# Patient Record
Sex: MALE | Race: WHITE | NOT HISPANIC OR LATINO | Employment: OTHER | URBAN - NONMETROPOLITAN AREA
[De-identification: names, ages, dates, MRNs, and addresses within clinical notes are randomized per-mention and may not be internally consistent; named-entity substitution may affect disease eponyms.]

---

## 2017-01-12 ENCOUNTER — HOSPITAL ENCOUNTER (OUTPATIENT)
Dept: OTHER | Facility: HOSPITAL | Age: 52
Discharge: HOME | End: 2017-01-12
Attending: PAIN MEDICINE | Admitting: PAIN MEDICINE

## 2017-01-16 ENCOUNTER — TELEPHONE (OUTPATIENT)
Dept: PAIN MEDICINE | Facility: CLINIC | Age: 52
End: 2017-01-16

## 2017-01-16 NOTE — TELEPHONE ENCOUNTER
Pt called this morning to state that the injections that he had on 01/12/2017 were effective (80%) and he would like to get the next set scheduled.

## 2017-01-26 ENCOUNTER — HOSPITAL ENCOUNTER (OUTPATIENT)
Dept: INTERVENTIONAL RADIOLOGY/VASCULAR | Facility: HOSPITAL | Age: 52
Discharge: HOME | End: 2017-01-26
Admitting: PAIN MEDICINE

## 2017-01-26 ENCOUNTER — DOCUMENTATION (OUTPATIENT)
Dept: PAIN MEDICINE | Facility: CLINIC | Age: 52
End: 2017-01-26

## 2017-01-26 ENCOUNTER — HOSPITAL ENCOUNTER (OUTPATIENT)
Dept: INTERVENTIONAL RADIOLOGY/VASCULAR | Facility: HOSPITAL | Age: 52
Discharge: HOME | End: 2017-01-26

## 2017-01-26 VITALS
DIASTOLIC BLOOD PRESSURE: 96 MMHG | SYSTOLIC BLOOD PRESSURE: 158 MMHG | OXYGEN SATURATION: 97 % | RESPIRATION RATE: 20 BRPM | HEART RATE: 109 BPM

## 2017-01-26 DIAGNOSIS — M47.817 LUMBOSACRAL SPONDYLOSIS WITHOUT MYELOPATHY: ICD-10-CM

## 2017-01-26 PROCEDURE — 25010000002 METHYLPREDNISOLONE PER 40 MG: Performed by: PAIN MEDICINE

## 2017-01-26 PROCEDURE — 64494 INJ PARAVERT F JNT L/S 2 LEV: CPT | Performed by: PAIN MEDICINE

## 2017-01-26 PROCEDURE — 64493 INJ PARAVERT F JNT L/S 1 LEV: CPT | Performed by: PAIN MEDICINE

## 2017-01-26 RX ORDER — METHYLPREDNISOLONE ACETATE 40 MG/ML
INJECTION, SUSPENSION INTRA-ARTICULAR; INTRALESIONAL; INTRAMUSCULAR; SOFT TISSUE
Status: COMPLETED | OUTPATIENT
Start: 2017-01-26 | End: 2017-01-26

## 2017-01-26 RX ORDER — LIDOCAINE HYDROCHLORIDE 20 MG/ML
INJECTION, SOLUTION INFILTRATION; PERINEURAL
Status: COMPLETED | OUTPATIENT
Start: 2017-01-26 | End: 2017-01-26

## 2017-01-26 RX ORDER — BUPIVACAINE HYDROCHLORIDE 5 MG/ML
INJECTION, SOLUTION EPIDURAL; INTRACAUDAL
Status: COMPLETED | OUTPATIENT
Start: 2017-01-26 | End: 2017-01-26

## 2017-01-26 RX ADMIN — LIDOCAINE HYDROCHLORIDE 2 ML: 20 INJECTION, SOLUTION INFILTRATION; PERINEURAL at 13:40

## 2017-01-26 RX ADMIN — BUPIVACAINE HYDROCHLORIDE 4 ML: 5 INJECTION, SOLUTION EPIDURAL; INTRACAUDAL; PERINEURAL at 13:41

## 2017-01-26 RX ADMIN — METHYLPREDNISOLONE ACETATE 8 MG: 40 INJECTION, SUSPENSION INTRA-ARTICULAR; INTRALESIONAL; INTRAMUSCULAR; SOFT TISSUE at 13:42

## 2017-01-26 NOTE — PROGRESS NOTES
Subjective   Jason Garcia is a 52 y.o. male    History of Present Illness    Previous clinic note reviewed and unchanged.  Vitals obtained by angio staff and reviewed    Review of Systems    Objective   Physical Exam   ctab  + facet loading b      Assessment/Plan   b lmbb's

## 2017-02-01 ENCOUNTER — TELEPHONE (OUTPATIENT)
Dept: PAIN MEDICINE | Facility: CLINIC | Age: 52
End: 2017-02-01

## 2017-02-23 ENCOUNTER — APPOINTMENT (OUTPATIENT)
Dept: LAB | Facility: HOSPITAL | Age: 52
End: 2017-02-23

## 2017-02-23 ENCOUNTER — OFFICE VISIT (OUTPATIENT)
Dept: PAIN MEDICINE | Facility: CLINIC | Age: 52
End: 2017-02-23

## 2017-02-23 VITALS
BODY MASS INDEX: 28.15 KG/M2 | DIASTOLIC BLOOD PRESSURE: 88 MMHG | SYSTOLIC BLOOD PRESSURE: 140 MMHG | HEIGHT: 70 IN | WEIGHT: 196.6 LBS

## 2017-02-23 DIAGNOSIS — M51.36 DDD (DEGENERATIVE DISC DISEASE), LUMBAR: ICD-10-CM

## 2017-02-23 DIAGNOSIS — M47.817 LUMBOSACRAL SPONDYLOSIS WITHOUT MYELOPATHY: Primary | ICD-10-CM

## 2017-02-23 DIAGNOSIS — Z79.899 HIGH RISK MEDICATIONS (NOT ANTICOAGULANTS) LONG-TERM USE: ICD-10-CM

## 2017-02-23 PROCEDURE — 80307 DRUG TEST PRSMV CHEM ANLYZR: CPT | Performed by: PAIN MEDICINE

## 2017-02-23 PROCEDURE — 99214 OFFICE O/P EST MOD 30 MIN: CPT | Performed by: PAIN MEDICINE

## 2017-02-23 PROCEDURE — G0481 DRUG TEST DEF 8-14 CLASSES: HCPCS | Performed by: PAIN MEDICINE

## 2017-02-23 RX ORDER — RANITIDINE 300 MG/1
TABLET ORAL
COMMUNITY
Start: 2017-02-20

## 2017-02-23 RX ORDER — AMITRIPTYLINE HYDROCHLORIDE 50 MG/1
50 TABLET, FILM COATED ORAL
COMMUNITY
Start: 2016-10-14 | End: 2017-02-23

## 2017-02-23 RX ORDER — HYDROCODONE BITARTRATE AND ACETAMINOPHEN 10; 325 MG/1; MG/1
1 TABLET ORAL 4 TIMES DAILY
Qty: 120 TABLET | Refills: 0 | Status: SHIPPED | OUTPATIENT
Start: 2017-02-23 | End: 2017-03-25

## 2017-02-23 RX ORDER — GABAPENTIN 400 MG/1
400 CAPSULE ORAL
COMMUNITY
Start: 2016-10-14

## 2017-02-23 RX ORDER — RANITIDINE 300 MG/1
300 TABLET ORAL
COMMUNITY
Start: 2017-01-18 | End: 2018-01-19

## 2017-02-23 RX ORDER — HYDROCODONE BITARTRATE AND ACETAMINOPHEN 10; 325 MG/1; MG/1
TABLET ORAL
COMMUNITY
Start: 2015-12-18

## 2017-02-23 NOTE — PROGRESS NOTES
Jason Garcia is a 52 y.o. male.   1965    HPI:   Location: lower back  Quality: sharp, dull and tingling  Severity: 8/10  Timing: constant  Alleviating: rest  Aggravating: increased activity     Patient denies side effects, he reports that his opioid medication still provides significant relief and allows him to be more active.  LMBB's helped the first time, but not really the second.        The following portions of the patient's history were reviewed by me and updated as appropriate: allergies, current medications, past family history, past medical history, past social history, past surgical history and problem list.    Past Medical History   Diagnosis Date   • Anxiety      Anxiety state      • Arthropathy of lumbar facet joint    • Degeneration of lumbar intervertebral disc    • Depression      Depressive disorder      • Drug therapy      Other long term (current) drug therapy      • Hypertensive disorder    • Osteoporosis        Social History     Social History   • Marital status:      Spouse name: N/A   • Number of children: N/A   • Years of education: N/A     Occupational History   • Not on file.     Social History Main Topics   • Smoking status: Former Smoker   • Smokeless tobacco: Not on file   • Alcohol use No   • Drug use: No   • Sexual activity: Defer     Other Topics Concern   • Not on file     Social History Narrative       Family History   Problem Relation Age of Onset   • Alcohol abuse Other    • Hyperlipidemia Other    • Hypertension Other    • Migraines Other    • Mental illness Other      Mental Disorder         Current Outpatient Prescriptions:   •  amitriptyline (ELAVIL) 50 MG tablet, Take 50 mg by mouth every night., Disp: , Rfl:   •  diclofenac sodium (VOTAREN XR) 100 MG 24 hr tablet, Take 100 mg by mouth daily., Disp: , Rfl:   •  gabapentin (NEURONTIN) 400 MG capsule, Take 400 mg by mouth 3 (three) times a day., Disp: , Rfl:   •  HYDROcodone-acetaminophen (NORCO)  MG  per tablet, Take 1 tablet by mouth every 6 (six) hours as needed., Disp: , Rfl:   •  isosorbide mononitrate (IMDUR) 30 MG 24 hr tablet, Take 30 mg by mouth daily., Disp: , Rfl:   •  metoprolol tartrate (LOPRESSOR) 25 MG tablet, TAKE 1 TABLET BY MOUTH NIGHTLY., Disp: , Rfl:   •  raNITIdine (ZANTAC) 300 MG tablet, Take 300 mg by mouth., Disp: , Rfl:   •  gabapentin (NEURONTIN) 400 MG capsule, Take 400 mg by mouth., Disp: , Rfl:   •  HYDROcodone-acetaminophen (NORCO)  MG per tablet, 1 tab po q6 hrs from 12/21/15-1/8/16, until seen by Pain Mgmt, Disp: , Rfl:   •  HYDROcodone-acetaminophen (NORCO)  MG per tablet, Take 1 tablet by mouth 4 (Four) Times a Day for 30 days., Disp: 120 tablet, Rfl: 0  •  HYDROcodone-acetaminophen (NORCO)  MG per tablet, Take 1 tablet by mouth 4 (Four) Times a Day for 30 days., Disp: 120 tablet, Rfl: 0  •  raNITIdine (ZANTAC) 300 MG tablet, , Disp: , Rfl:     Allergies   Allergen Reactions   • Cymbalta [Duloxetine Hcl]          Review of Systems   Musculoskeletal: Positive for back pain (lower).     10 system review of systems was reviewed and negative except for above.    Physical Exam   Constitutional: He appears well-developed and well-nourished. No distress.   Musculoskeletal:        Lumbar back: He exhibits decreased range of motion (facet loading bilaterally. limited ext to10 deg.  45 deg flexion limited to pain).   Neurological: He is alert. Gait (cane) abnormal.   Psychiatric: He has a normal mood and affect. His behavior is normal. Judgment normal.       Jason was seen today for back pain.    Diagnoses and all orders for this visit:    Lumbosacral spondylosis without myelopathy  -     ToxASSURE Select 13 (MW)    DDD (degenerative disc disease), lumbar  -     ToxASSURE Select 13 (MW)    High risk medications (not anticoagulants) long-term use  -     ToxASSURE Select 13 (MW)    Other orders  -     HYDROcodone-acetaminophen (NORCO)  MG per tablet; Take 1 tablet by  mouth 4 (Four) Times a Day for 30 days.  -     HYDROcodone-acetaminophen (NORCO)  MG per tablet; Take 1 tablet by mouth 4 (Four) Times a Day for 30 days.        Medication: Patient reports no negative side effects, Patient reports appropriate usage and storage habits, Patient's opioid provides enough reflief to be more active and perform activities of daily living with less discomfort. and Refill opioid medication as above.    Interventional: none at this time.  Discussed possible stim trial.    Rehab: none at this time    Behavioral: No aberrant behavior noted. [facility] Report #[id number]  was reviewed and is consistent with stated history    Urine drug screen Ordered today to test for drugs of abuse and prescribed medications          This document has been electronically signed by Speedy Watson MD on February 23, 2017 12:05 PM

## 2017-02-28 LAB
CONV REPORT SUMMARY: NORMAL
Lab: NORMAL

## 2017-04-21 ENCOUNTER — OFFICE VISIT (OUTPATIENT)
Dept: PAIN MEDICINE | Facility: CLINIC | Age: 52
End: 2017-04-21

## 2017-04-21 VITALS
SYSTOLIC BLOOD PRESSURE: 140 MMHG | BODY MASS INDEX: 27.6 KG/M2 | HEIGHT: 70 IN | DIASTOLIC BLOOD PRESSURE: 88 MMHG | WEIGHT: 192.8 LBS

## 2017-04-21 DIAGNOSIS — M47.817 LUMBOSACRAL SPONDYLOSIS WITHOUT MYELOPATHY: Primary | ICD-10-CM

## 2017-04-21 DIAGNOSIS — M51.36 DDD (DEGENERATIVE DISC DISEASE), LUMBAR: ICD-10-CM

## 2017-04-21 DIAGNOSIS — Z79.899 HIGH RISK MEDICATIONS (NOT ANTICOAGULANTS) LONG-TERM USE: ICD-10-CM

## 2017-04-21 PROCEDURE — 99214 OFFICE O/P EST MOD 30 MIN: CPT | Performed by: PAIN MEDICINE

## 2017-04-21 RX ORDER — AMITRIPTYLINE HYDROCHLORIDE 50 MG/1
TABLET, FILM COATED ORAL
COMMUNITY
Start: 2017-04-18

## 2017-04-21 RX ORDER — RANITIDINE 300 MG/1
300 TABLET ORAL
COMMUNITY
Start: 2017-01-18 | End: 2018-01-19

## 2017-04-21 RX ORDER — HYDROCODONE BITARTRATE AND ACETAMINOPHEN 10; 325 MG/1; MG/1
TABLET ORAL
COMMUNITY
Start: 2015-12-18

## 2017-04-21 RX ORDER — HYDROCODONE BITARTRATE AND ACETAMINOPHEN 10; 325 MG/1; MG/1
1 TABLET ORAL 4 TIMES DAILY
Qty: 120 TABLET | Refills: 0 | Status: SHIPPED | OUTPATIENT
Start: 2017-04-21 | End: 2017-05-21

## 2017-04-21 RX ORDER — GABAPENTIN 400 MG/1
400 CAPSULE ORAL
COMMUNITY
Start: 2017-04-18

## 2017-04-21 NOTE — PROGRESS NOTES
Jason Garcia is a 52 y.o. male.   1965    HPI:   Location: lower back  Quality: sharp, dull and tingling  Severity: 8/10  Timing: constant  Alleviating: rest  Aggravating: increased activity     Pain is bothered by the weather today he states.  Otherwise he is stable.  Opioid continues to provide relief and allows him to be more active than he would be otherwise.  No significanty side effects.       The following portions of the patient's history were reviewed by me and updated as appropriate: allergies, current medications, past family history, past medical history, past social history, past surgical history and problem list.    Past Medical History:   Diagnosis Date   • Anxiety     Anxiety state      • Arthropathy of lumbar facet joint    • Degeneration of lumbar intervertebral disc    • Depression     Depressive disorder      • Drug therapy     Other long term (current) drug therapy      • Hypertensive disorder    • Osteoporosis        Social History     Social History   • Marital status:      Spouse name: N/A   • Number of children: N/A   • Years of education: N/A     Occupational History   • Not on file.     Social History Main Topics   • Smoking status: Former Smoker   • Smokeless tobacco: Not on file   • Alcohol use No   • Drug use: No   • Sexual activity: Defer     Other Topics Concern   • Not on file     Social History Narrative       Family History   Problem Relation Age of Onset   • Alcohol abuse Other    • Hyperlipidemia Other    • Hypertension Other    • Migraines Other    • Mental illness Other      Mental Disorder         Current Outpatient Prescriptions:   •  amitriptyline (ELAVIL) 50 MG tablet, Take 50 mg by mouth every night., Disp: , Rfl:   •  diclofenac sodium (VOTAREN XR) 100 MG 24 hr tablet, Take 100 mg by mouth daily., Disp: , Rfl:   •  gabapentin (NEURONTIN) 400 MG capsule, Take 400 mg by mouth 3 (three) times a day., Disp: , Rfl:   •  gabapentin (NEURONTIN) 400 MG capsule,  Take 400 mg by mouth., Disp: , Rfl:   •  HYDROcodone-acetaminophen (NORCO)  MG per tablet, Take 1 tablet by mouth every 6 (six) hours as needed., Disp: , Rfl:   •  isosorbide mononitrate (IMDUR) 30 MG 24 hr tablet, Take 30 mg by mouth daily., Disp: , Rfl:   •  metoprolol tartrate (LOPRESSOR) 25 MG tablet, TAKE 1 TABLET BY MOUTH NIGHTLY., Disp: , Rfl:   •  raNITIdine (ZANTAC) 300 MG tablet, Take 300 mg by mouth., Disp: , Rfl:   •  amitriptyline (ELAVIL) 50 MG tablet, TAKE 1 TABLET BY MOUTH NIGHTLY., Disp: , Rfl:   •  gabapentin (NEURONTIN) 400 MG capsule, Take 400 mg by mouth., Disp: , Rfl:   •  HYDROcodone-acetaminophen (NORCO)  MG per tablet, 1 tab po q6 hrs from 12/21/15-1/8/16, until seen by Pain Mgmt, Disp: , Rfl:   •  HYDROcodone-acetaminophen (NORCO)  MG per tablet, 1 tab po q6 hrs from 12/21/15-1/8/16, until seen by Pain Mgmt, Disp: , Rfl:   •  HYDROcodone-acetaminophen (NORCO)  MG per tablet, Take 1 tablet by mouth 4 (Four) Times a Day for 30 days., Disp: 120 tablet, Rfl: 0  •  HYDROcodone-acetaminophen (NORCO)  MG per tablet, Take 1 tablet by mouth 4 (Four) Times a Day for 30 days., Disp: 120 tablet, Rfl: 0  •  metoprolol tartrate (LOPRESSOR) 25 MG tablet, TAKE 1 TABLET BY MOUTH NIGHTLY., Disp: , Rfl:   •  raNITIdine (ZANTAC) 300 MG tablet, , Disp: , Rfl:   •  raNITIdine (ZANTAC) 300 MG tablet, Take 300 mg by mouth., Disp: , Rfl:     Allergies   Allergen Reactions   • Cymbalta [Duloxetine Hcl]      Made heart speed up- stomach cramps- couldn't sleep- flush face   • Isosorbide Palpitations     Heart palpitations, headache, dizzy         Review of Systems   Musculoskeletal: Positive for back pain (lower).     10 system review of systems was reviewed and negative except for above.    Physical Exam   Constitutional: He appears well-developed and well-nourished. No distress.   Musculoskeletal:        Lumbar back: He exhibits decreased range of motion (45deg flexion with approx 10  deg ext with pain in both directions. ).   Neurological: He is alert. Gait (cane) abnormal.   Psychiatric: He has a normal mood and affect. His behavior is normal. Judgment normal.       Jason was seen today for back pain.    Diagnoses and all orders for this visit:    Lumbosacral spondylosis without myelopathy    DDD (degenerative disc disease), lumbar    High risk medications (not anticoagulants) long-term use    Other orders  -     HYDROcodone-acetaminophen (NORCO)  MG per tablet; Take 1 tablet by mouth 4 (Four) Times a Day for 30 days.  -     HYDROcodone-acetaminophen (NORCO)  MG per tablet; Take 1 tablet by mouth 4 (Four) Times a Day for 30 days.      Medication: Patient reports no negative side effects, Patient reports appropriate usage and storage habits, Patient's opioid provides enough reflief to be more active and perform activities of daily living with less discomfort. and Refill opioid medication as above    Interventional: none at this time.  Not much improvement with interventions in the past.     Rehab: none at this time    Behavioral: No aberrant behavior noted. [facility] Report #[id number]  was reviewed and is consistent with stated history    Urine drug screen Reviewed from last visit and is appropriate          This document has been electronically signed by Speedy Watson MD on April 21, 2017 12:21 PM

## 2017-06-20 ENCOUNTER — OFFICE VISIT (OUTPATIENT)
Dept: PAIN MEDICINE | Facility: CLINIC | Age: 52
End: 2017-06-20

## 2017-06-20 VITALS
SYSTOLIC BLOOD PRESSURE: 140 MMHG | HEIGHT: 70 IN | WEIGHT: 187.1 LBS | DIASTOLIC BLOOD PRESSURE: 80 MMHG | BODY MASS INDEX: 26.79 KG/M2

## 2017-06-20 DIAGNOSIS — M51.36 DDD (DEGENERATIVE DISC DISEASE), LUMBAR: Primary | ICD-10-CM

## 2017-06-20 DIAGNOSIS — Z79.899 HIGH RISK MEDICATIONS (NOT ANTICOAGULANTS) LONG-TERM USE: ICD-10-CM

## 2017-06-20 DIAGNOSIS — Z98.890 HISTORY OF BACK SURGERY: ICD-10-CM

## 2017-06-20 DIAGNOSIS — M47.817 LUMBOSACRAL SPONDYLOSIS WITHOUT MYELOPATHY: ICD-10-CM

## 2017-06-20 PROCEDURE — 99213 OFFICE O/P EST LOW 20 MIN: CPT | Performed by: PAIN MEDICINE

## 2017-06-20 NOTE — PROGRESS NOTES
Jason Garcia is a 52 y.o. male.   1965    HPI:   Location: lower back  Quality: sharp, dull and tingling  Severity: 8/10  Timing: constant  Alleviating: rest  Aggravating: increased activity    Pt remains with Chronic lower back and surgery in 2013. No change in status. Opiate medications provides enough relief for daily activity and ambulation. NO SE. Pt happy with pain management visit and regimen.         The following portions of the patient's history were reviewed by me and updated as appropriate: allergies, current medications, past family history, past medical history, past social history, past surgical history and problem list.    Past Medical History:   Diagnosis Date   • Anxiety     Anxiety state      • Arthropathy of lumbar facet joint    • Degeneration of lumbar intervertebral disc    • Depression     Depressive disorder      • Drug therapy     Other long term (current) drug therapy      • Hypertensive disorder    • Osteoporosis        Social History     Social History   • Marital status:      Spouse name: N/A   • Number of children: N/A   • Years of education: N/A     Occupational History   • Not on file.     Social History Main Topics   • Smoking status: Former Smoker   • Smokeless tobacco: Never Used   • Alcohol use No   • Drug use: No   • Sexual activity: Defer     Other Topics Concern   • Not on file     Social History Narrative       Family History   Problem Relation Age of Onset   • Alcohol abuse Other    • Hyperlipidemia Other    • Hypertension Other    • Migraines Other    • Mental illness Other      Mental Disorder         Current Outpatient Prescriptions:   •  amitriptyline (ELAVIL) 50 MG tablet, Take 50 mg by mouth every night., Disp: , Rfl:   •  amitriptyline (ELAVIL) 50 MG tablet, TAKE 1 TABLET BY MOUTH NIGHTLY., Disp: , Rfl:   •  diclofenac sodium (VOTAREN XR) 100 MG 24 hr tablet, Take 100 mg by mouth daily., Disp: , Rfl:   •  gabapentin (NEURONTIN) 400 MG capsule, Take  400 mg by mouth 3 (three) times a day., Disp: , Rfl:   •  gabapentin (NEURONTIN) 400 MG capsule, Take 400 mg by mouth., Disp: , Rfl:   •  gabapentin (NEURONTIN) 400 MG capsule, Take 400 mg by mouth., Disp: , Rfl:   •  HYDROcodone-acetaminophen (NORCO)  MG per tablet, Take 1 tablet by mouth every 6 (six) hours as needed., Disp: , Rfl:   •  HYDROcodone-acetaminophen (NORCO)  MG per tablet, 1 tab po q6 hrs from 12/21/15-1/8/16, until seen by Pain Mgmt, Disp: , Rfl:   •  HYDROcodone-acetaminophen (NORCO)  MG per tablet, 1 tab po q6 hrs from 12/21/15-1/8/16, until seen by Pain Mgmt, Disp: , Rfl:   •  isosorbide mononitrate (IMDUR) 30 MG 24 hr tablet, Take 30 mg by mouth daily., Disp: , Rfl:   •  metoprolol tartrate (LOPRESSOR) 25 MG tablet, TAKE 1 TABLET BY MOUTH NIGHTLY., Disp: , Rfl:   •  metoprolol tartrate (LOPRESSOR) 25 MG tablet, TAKE 1 TABLET BY MOUTH NIGHTLY., Disp: , Rfl:   •  raNITIdine (ZANTAC) 300 MG tablet, Take 300 mg by mouth., Disp: , Rfl:   •  raNITIdine (ZANTAC) 300 MG tablet, , Disp: , Rfl:   •  raNITIdine (ZANTAC) 300 MG tablet, Take 300 mg by mouth., Disp: , Rfl:     Allergies   Allergen Reactions   • Cymbalta [Duloxetine Hcl]      Made heart speed up- stomach cramps- couldn't sleep- flush face   • Isosorbide Palpitations     Heart palpitations, headache, dizzy       Review of Systems   Musculoskeletal: Positive for back pain.   All other systems reviewed and are negative.    All systems reviewed and negative except for above.    Physical Exam   Constitutional: He is oriented to person, place, and time. He appears well-developed and well-nourished. No distress.   Cardiovascular: Normal rate.    Pulmonary/Chest: Effort normal. No respiratory distress.   Musculoskeletal:        Lumbar back: He exhibits decreased range of motion (Flex 50 deg and  10 deg ext with robin [place]  ) and tenderness ( midline ).   Neurological: He is alert and oriented to person, place, and time. He displays no  seizure activity. Gait (cane) abnormal. Coordination normal.   Skin: Skin is warm and dry. No pallor.   Psychiatric: He has a normal mood and affect. His behavior is normal. Judgment normal. He expresses no homicidal and no suicidal ideation. He expresses no suicidal plans and no homicidal plans.   Nursing note and vitals reviewed.      Jason was seen today for back pain.    Diagnoses and all orders for this visit:    DDD (degenerative disc disease), lumbar    Lumbosacral spondylosis without myelopathy    History of back surgery    High risk medications (not anticoagulants) long-term use        Medication: Patient reports no negative side effects, Patient reports appropriate usage and storage habits and Patient's opioid provides enough reflief to be more active and perform activities of daily living with less discomfort. norco 10mg qid. Discussed case with Yared Watson, opiate medication refilled ×2 hand written scripts.      Interventional: Injections in past with minimal effective.     Rehab: Cane for ambulation.     Behavioral: No aberrant behavior noted. [facility] Report # [id number]  was reviewed and is consistent with stated history    Urine drug screen None at this time          This document has been electronically signed by JOSE Riggs on June 20, 2017 1:57 PM          This document has been electronically signed by JOSE Riggs on June 20, 2017 1:57 PM

## 2017-08-16 ENCOUNTER — APPOINTMENT (OUTPATIENT)
Dept: LAB | Facility: HOSPITAL | Age: 52
End: 2017-08-16

## 2017-08-16 ENCOUNTER — OFFICE VISIT (OUTPATIENT)
Dept: PAIN MEDICINE | Facility: CLINIC | Age: 52
End: 2017-08-16

## 2017-08-16 VITALS
DIASTOLIC BLOOD PRESSURE: 98 MMHG | WEIGHT: 188.6 LBS | SYSTOLIC BLOOD PRESSURE: 150 MMHG | HEIGHT: 70 IN | BODY MASS INDEX: 27 KG/M2

## 2017-08-16 DIAGNOSIS — M51.36 DDD (DEGENERATIVE DISC DISEASE), LUMBAR: Primary | ICD-10-CM

## 2017-08-16 DIAGNOSIS — M47.817 LUMBOSACRAL SPONDYLOSIS WITHOUT MYELOPATHY: ICD-10-CM

## 2017-08-16 DIAGNOSIS — Z79.899 HIGH RISK MEDICATIONS (NOT ANTICOAGULANTS) LONG-TERM USE: ICD-10-CM

## 2017-08-16 PROCEDURE — 99214 OFFICE O/P EST MOD 30 MIN: CPT | Performed by: PAIN MEDICINE

## 2017-08-16 PROCEDURE — 80307 DRUG TEST PRSMV CHEM ANLYZR: CPT | Performed by: PAIN MEDICINE

## 2017-08-16 PROCEDURE — G0481 DRUG TEST DEF 8-14 CLASSES: HCPCS | Performed by: PAIN MEDICINE

## 2017-08-16 RX ORDER — HYDROCODONE BITARTRATE AND ACETAMINOPHEN 10; 325 MG/1; MG/1
1 TABLET ORAL 4 TIMES DAILY
Qty: 120 TABLET | Refills: 0 | Status: SHIPPED | OUTPATIENT
Start: 2017-08-16 | End: 2017-09-15

## 2017-08-16 RX ORDER — GABAPENTIN 400 MG/1
400 CAPSULE ORAL 3 TIMES DAILY
Qty: 90 CAPSULE | Refills: 3 | Status: SHIPPED | OUTPATIENT
Start: 2017-08-16

## 2017-08-16 NOTE — PROGRESS NOTES
Jason Garcia is a 52 y.o. male.   1965    HPI:   Location: lower back  Quality: sharp, dull and tingling  Severity: 8/10  Timing: constant  Alleviating: rest  Aggravating: increased activity     Patient reports that the opioid medication still provides him good relief and allows increased activity than he would have without the opioid medication. He denies side effects.        The following portions of the patient's history were reviewed by me and updated as appropriate: allergies, current medications, past family history, past medical history, past social history, past surgical history and problem list.    Past Medical History:   Diagnosis Date   • Anxiety     Anxiety state      • Arthropathy of lumbar facet joint    • Degeneration of lumbar intervertebral disc    • Depression     Depressive disorder      • Drug therapy     Other long term (current) drug therapy      • Hypertensive disorder    • Osteoporosis        Social History     Social History   • Marital status:      Spouse name: N/A   • Number of children: N/A   • Years of education: N/A     Occupational History   • Not on file.     Social History Main Topics   • Smoking status: Former Smoker   • Smokeless tobacco: Never Used   • Alcohol use No   • Drug use: No   • Sexual activity: Defer     Other Topics Concern   • Not on file     Social History Narrative       Family History   Problem Relation Age of Onset   • Alcohol abuse Other    • Hyperlipidemia Other    • Hypertension Other    • Migraines Other    • Mental illness Other      Mental Disorder         Current Outpatient Prescriptions:   •  amitriptyline (ELAVIL) 50 MG tablet, Take 50 mg by mouth every night., Disp: , Rfl:   •  amitriptyline (ELAVIL) 50 MG tablet, TAKE 1 TABLET BY MOUTH NIGHTLY., Disp: , Rfl:   •  diclofenac sodium (VOTAREN XR) 100 MG 24 hr tablet, Take 50 mg by mouth Daily., Disp: , Rfl:   •  gabapentin (NEURONTIN) 400 MG capsule, Take 400 mg by mouth 3 (three) times a  day., Disp: , Rfl:   •  gabapentin (NEURONTIN) 400 MG capsule, Take 400 mg by mouth., Disp: , Rfl:   •  gabapentin (NEURONTIN) 400 MG capsule, Take 400 mg by mouth., Disp: , Rfl:   •  HYDROcodone-acetaminophen (NORCO)  MG per tablet, Take 1 tablet by mouth every 6 (six) hours as needed., Disp: , Rfl:   •  HYDROcodone-acetaminophen (NORCO)  MG per tablet, 1 tab po q6 hrs from 12/21/15-1/8/16, until seen by Pain Mgmt, Disp: , Rfl:   •  HYDROcodone-acetaminophen (NORCO)  MG per tablet, 1 tab po q6 hrs from 12/21/15-1/8/16, until seen by Pain Mgmt, Disp: , Rfl:   •  isosorbide mononitrate (IMDUR) 30 MG 24 hr tablet, Take 30 mg by mouth daily., Disp: , Rfl:   •  metoprolol tartrate (LOPRESSOR) 25 MG tablet, TAKE 1 TABLET BY MOUTH NIGHTLY., Disp: , Rfl:   •  metoprolol tartrate (LOPRESSOR) 25 MG tablet, TAKE 1 TABLET BY MOUTH NIGHTLY., Disp: , Rfl:   •  raNITIdine (ZANTAC) 300 MG tablet, Take 300 mg by mouth., Disp: , Rfl:   •  raNITIdine (ZANTAC) 300 MG tablet, , Disp: , Rfl:   •  raNITIdine (ZANTAC) 300 MG tablet, Take 300 mg by mouth., Disp: , Rfl:   •  gabapentin (NEURONTIN) 400 MG capsule, Take 1 capsule by mouth 3 (Three) Times a Day., Disp: 90 capsule, Rfl: 3  •  HYDROcodone-acetaminophen (NORCO)  MG per tablet, Take 1 tablet by mouth 4 (Four) Times a Day for 30 days., Disp: 120 tablet, Rfl: 0  •  HYDROcodone-acetaminophen (NORCO)  MG per tablet, Take 1 tablet by mouth 4 (Four) Times a Day for 30 days., Disp: 120 tablet, Rfl: 0    Allergies   Allergen Reactions   • Cymbalta [Duloxetine Hcl]      Made heart speed up- stomach cramps- couldn't sleep- flush face   • Isosorbide Palpitations     Heart palpitations, headache, dizzy       Review of Systems   Musculoskeletal: Positive for back pain (lower).   All other systems reviewed and are negative.    All systems reviewed and negative except for above.    Physical Exam   Constitutional: He appears well-developed and well-nourished. No  distress.   Musculoskeletal:        Lumbar back: He exhibits decreased range of motion (80deg flexion with approx 15 deg ext with pain in both directions. ).   Neurological: He is alert. Gait (cane) abnormal.   Psychiatric: He has a normal mood and affect. His behavior is normal. Judgment normal.       Jason was seen today for back pain.    Diagnoses and all orders for this visit:    DDD (degenerative disc disease), lumbar  -     ToxASSURE Select 13 (MW)    Lumbosacral spondylosis without myelopathy  -     ToxASSURE Select 13 (MW)    High risk medications (not anticoagulants) long-term use  -     ToxASSURE Select 13 (MW)    Other orders  -     HYDROcodone-acetaminophen (NORCO)  MG per tablet; Take 1 tablet by mouth 4 (Four) Times a Day for 30 days.  -     HYDROcodone-acetaminophen (NORCO)  MG per tablet; Take 1 tablet by mouth 4 (Four) Times a Day for 30 days.  -     gabapentin (NEURONTIN) 400 MG capsule; Take 1 capsule by mouth 3 (Three) Times a Day.      Medication: Patient reports no negative side effects, Patient reports appropriate usage and storage habits, Patient's opioid provides enough reflief to be more active and perform activities of daily living with less discomfort. and Refill opioid medication as above    Interventional: none at this time    Rehab: none at this time    Behavioral: No aberrant behavior noted. [facility] Report #[id number]  was reviewed and is consistent with stated history    Urine drug screen Ordered today to test for drugs of abuse and prescribed medications          This document has been electronically signed by Speedy Watson MD on August 16, 2017 2:31 PM

## 2017-08-24 LAB — CONV REPORT SUMMARY: NORMAL

## 2017-10-18 ENCOUNTER — OFFICE VISIT (OUTPATIENT)
Dept: PAIN MEDICINE | Facility: CLINIC | Age: 52
End: 2017-10-18

## 2017-10-18 VITALS
HEIGHT: 70 IN | WEIGHT: 187 LBS | SYSTOLIC BLOOD PRESSURE: 140 MMHG | DIASTOLIC BLOOD PRESSURE: 88 MMHG | BODY MASS INDEX: 26.77 KG/M2

## 2017-10-18 DIAGNOSIS — M51.36 DDD (DEGENERATIVE DISC DISEASE), LUMBAR: Primary | ICD-10-CM

## 2017-10-18 DIAGNOSIS — M54.5 CHRONIC LOW BACK PAIN, UNSPECIFIED BACK PAIN LATERALITY, WITH SCIATICA PRESENCE UNSPECIFIED: ICD-10-CM

## 2017-10-18 DIAGNOSIS — Z79.899 HIGH RISK MEDICATIONS (NOT ANTICOAGULANTS) LONG-TERM USE: ICD-10-CM

## 2017-10-18 DIAGNOSIS — Z98.890 HISTORY OF BACK SURGERY: ICD-10-CM

## 2017-10-18 DIAGNOSIS — M79.18 MYOFACIAL MUSCLE PAIN: ICD-10-CM

## 2017-10-18 DIAGNOSIS — G89.29 CHRONIC LOW BACK PAIN, UNSPECIFIED BACK PAIN LATERALITY, WITH SCIATICA PRESENCE UNSPECIFIED: ICD-10-CM

## 2017-10-18 PROCEDURE — 99214 OFFICE O/P EST MOD 30 MIN: CPT | Performed by: PAIN MEDICINE

## 2017-10-18 NOTE — PROGRESS NOTES
"Jason Garcia is a 52 y.o. male.   1965    HPI:   Location: lower back  Quality: sharp, dull and tingling  Severity: 8/10  Timing: constant  Alleviating: rest  Aggravating: increased activity    Pt reports \"no change in chronic low back - hx surgery\"- controlled by opiates. Pt uses cane for ambulation. Opiate medications provides enough relief for daily activity and ambulation. NO SE. Pt happy with pain management visit and regimen.             The following portions of the patient's history were reviewed by me and updated as appropriate: allergies, current medications, past family history, past medical history, past social history, past surgical history and problem list.    Past Medical History:   Diagnosis Date   • Anxiety     Anxiety state      • Arthropathy of lumbar facet joint    • Degeneration of lumbar intervertebral disc    • Depression     Depressive disorder      • Drug therapy     Other long term (current) drug therapy      • Hypertensive disorder    • Osteoporosis        Social History     Social History   • Marital status:      Spouse name: N/A   • Number of children: N/A   • Years of education: N/A     Occupational History   • Not on file.     Social History Main Topics   • Smoking status: Former Smoker   • Smokeless tobacco: Never Used   • Alcohol use No   • Drug use: No   • Sexual activity: Defer     Other Topics Concern   • Not on file     Social History Narrative       Family History   Problem Relation Age of Onset   • Alcohol abuse Other    • Hyperlipidemia Other    • Hypertension Other    • Migraines Other    • Mental illness Other      Mental Disorder         Current Outpatient Prescriptions:   •  amitriptyline (ELAVIL) 50 MG tablet, TAKE 1 TABLET BY MOUTH NIGHTLY., Disp: , Rfl:   •  diclofenac sodium (VOTAREN XR) 100 MG 24 hr tablet, Take 50 mg by mouth Daily., Disp: , Rfl:   •  gabapentin (NEURONTIN) 400 MG capsule, Take 400 mg by mouth 3 (three) times a day., Disp: , Rfl:   • "  HYDROcodone-acetaminophen (NORCO)  MG per tablet, Take 1 tablet by mouth every 6 (six) hours as needed., Disp: , Rfl:   •  isosorbide mononitrate (IMDUR) 30 MG 24 hr tablet, Take 30 mg by mouth daily., Disp: , Rfl:   •  metoprolol tartrate (LOPRESSOR) 25 MG tablet, TAKE 1 TABLET BY MOUTH NIGHTLY., Disp: , Rfl:   •  raNITIdine (ZANTAC) 300 MG tablet, Take 300 mg by mouth., Disp: , Rfl:   •  amitriptyline (ELAVIL) 50 MG tablet, Take 50 mg by mouth every night., Disp: , Rfl:   •  gabapentin (NEURONTIN) 400 MG capsule, Take 400 mg by mouth., Disp: , Rfl:   •  gabapentin (NEURONTIN) 400 MG capsule, Take 400 mg by mouth., Disp: , Rfl:   •  gabapentin (NEURONTIN) 400 MG capsule, Take 1 capsule by mouth 3 (Three) Times a Day., Disp: 90 capsule, Rfl: 3  •  HYDROcodone-acetaminophen (NORCO)  MG per tablet, 1 tab po q6 hrs from 12/21/15-1/8/16, until seen by Pain Mgmt, Disp: , Rfl:   •  HYDROcodone-acetaminophen (NORCO)  MG per tablet, 1 tab po q6 hrs from 12/21/15-1/8/16, until seen by Pain Mgmt, Disp: , Rfl:   •  metoprolol tartrate (LOPRESSOR) 25 MG tablet, TAKE 1 TABLET BY MOUTH NIGHTLY., Disp: , Rfl:   •  raNITIdine (ZANTAC) 300 MG tablet, Take 300 mg by mouth., Disp: , Rfl:   •  raNITIdine (ZANTAC) 300 MG tablet, , Disp: , Rfl:     Allergies   Allergen Reactions   • Cymbalta [Duloxetine Hcl]      Made heart speed up- stomach cramps- couldn't sleep- flush face   • Isosorbide Palpitations     Heart palpitations, headache, dizzy       Review of Systems   Musculoskeletal: Positive for back pain.   All other systems reviewed and are negative.    All systems reviewed and negative except for above.    Physical Exam   Constitutional: He is oriented to person, place, and time. He appears well-developed and well-nourished. No distress.   Cardiovascular: Normal rate.    Pulmonary/Chest: Effort normal. No respiratory distress.   Musculoskeletal:        Lumbar back: He exhibits decreased range of motion (Flex <60  deg and  <10 deg ext with bilateral facet loading) and tenderness ( midline  ttp).   Neurological: He is alert and oriented to person, place, and time. He displays no tremor. He displays no seizure activity. Gait (ambulation with cane) abnormal. Coordination normal.   Skin: Skin is warm and dry. No pallor.   Psychiatric: He has a normal mood and affect. His behavior is normal. Judgment normal. He expresses no homicidal and no suicidal ideation. He expresses no suicidal plans and no homicidal plans.   Nursing note and vitals reviewed.      Jason was seen today for back pain.    Diagnoses and all orders for this visit:    DDD (degenerative disc disease), lumbar    Chronic low back pain, unspecified back pain laterality, with sciatica presence unspecified    History of back surgery    Myofacial muscle pain    High risk medications (not anticoagulants) long-term use        Medication: Patient reports no negative side effects, Patient reports appropriate usage and storage habits and Patient's opioid provides enough relief to be more active and perform activities of daily living with less discomfort. norco 10mg qid. Discussed case with Yared Watson, opiate medication refilled ×2 hand written scripts.      Interventional: MARIZOL and any neurological impairments discussed with patient that may need of emergency evaluation.      Rehab: uses cane for ambulation.,     Behavioral: No aberrant behavior noted. YANI Report # [id number]  was reviewed and is consistent with stated history    Urine drug screen Reviewed from last visit and is appropriate        This document has been electronically signed by JOSE Riggs on October 18, 2017 1:50 PM          This document has been electronically signed by JOSE Riggs on October 18, 2017 1:50 PM

## 2017-12-13 ENCOUNTER — OFFICE VISIT (OUTPATIENT)
Dept: PAIN MEDICINE | Facility: CLINIC | Age: 52
End: 2017-12-13

## 2017-12-13 VITALS
DIASTOLIC BLOOD PRESSURE: 80 MMHG | SYSTOLIC BLOOD PRESSURE: 140 MMHG | HEIGHT: 70 IN | BODY MASS INDEX: 27.4 KG/M2 | WEIGHT: 191.4 LBS

## 2017-12-13 DIAGNOSIS — G89.29 CHRONIC BILATERAL LOW BACK PAIN WITHOUT SCIATICA: ICD-10-CM

## 2017-12-13 DIAGNOSIS — M54.50 CHRONIC BILATERAL LOW BACK PAIN WITHOUT SCIATICA: ICD-10-CM

## 2017-12-13 DIAGNOSIS — Z98.890 HX OF LUMBOSACRAL SPINE SURGERY: ICD-10-CM

## 2017-12-13 DIAGNOSIS — M51.36 DDD (DEGENERATIVE DISC DISEASE), LUMBAR: Primary | ICD-10-CM

## 2017-12-13 PROCEDURE — 99213 OFFICE O/P EST LOW 20 MIN: CPT | Performed by: PAIN MEDICINE

## 2017-12-13 RX ORDER — HYDROCODONE BITARTRATE AND ACETAMINOPHEN 10; 325 MG/1; MG/1
1 TABLET ORAL 4 TIMES DAILY
Qty: 120 TABLET | Refills: 0 | Status: SHIPPED | OUTPATIENT
Start: 2017-12-13 | End: 2018-01-12

## 2017-12-13 RX ORDER — GABAPENTIN 400 MG/1
400 CAPSULE ORAL 3 TIMES DAILY
Qty: 90 CAPSULE | Refills: 3 | Status: SHIPPED | OUTPATIENT
Start: 2017-12-13

## 2017-12-13 NOTE — PROGRESS NOTES
Jason Garcia is a 52 y.o. male.   1965    HPI:   Location: lower back  Quality: sharp, dull and tingling  Severity: 8/10  Timing: constant  Alleviating: rest  Aggravating: increased activity     Patient reports that the opioid medication still provides him good relief and allows more activity than he would have without the opioid medication. He denies side effects.        The following portions of the patient's history were reviewed by me and updated as appropriate: allergies, current medications, past family history, past medical history, past social history, past surgical history and problem list.    Past Medical History:   Diagnosis Date   • Anxiety     Anxiety state      • Arthropathy of lumbar facet joint    • Degeneration of lumbar intervertebral disc    • Depression     Depressive disorder      • Drug therapy     Other long term (current) drug therapy      • Hypertensive disorder    • Osteoporosis        Social History     Social History   • Marital status:      Spouse name: N/A   • Number of children: N/A   • Years of education: N/A     Occupational History   • Not on file.     Social History Main Topics   • Smoking status: Former Smoker   • Smokeless tobacco: Never Used   • Alcohol use No   • Drug use: No   • Sexual activity: Defer     Other Topics Concern   • Not on file     Social History Narrative       Family History   Problem Relation Age of Onset   • Alcohol abuse Other    • Hyperlipidemia Other    • Hypertension Other    • Migraines Other    • Mental illness Other      Mental Disorder         Current Outpatient Prescriptions:   •  amitriptyline (ELAVIL) 50 MG tablet, Take 50 mg by mouth every night., Disp: , Rfl:   •  amitriptyline (ELAVIL) 50 MG tablet, TAKE 1 TABLET BY MOUTH NIGHTLY., Disp: , Rfl:   •  diclofenac sodium (VOTAREN XR) 100 MG 24 hr tablet, Take 50 mg by mouth Daily., Disp: , Rfl:   •  gabapentin (NEURONTIN) 400 MG capsule, Take 400 mg by mouth., Disp: , Rfl:   •   gabapentin (NEURONTIN) 400 MG capsule, Take 400 mg by mouth., Disp: , Rfl:   •  gabapentin (NEURONTIN) 400 MG capsule, Take 1 capsule by mouth 3 (Three) Times a Day., Disp: 90 capsule, Rfl: 3  •  gabapentin (NEURONTIN) 400 MG capsule, Take 1 capsule by mouth 3 (Three) Times a Day., Disp: 90 capsule, Rfl: 3  •  HYDROcodone-acetaminophen (NORCO)  MG per tablet, Take 1 tablet by mouth every 6 (six) hours as needed., Disp: , Rfl:   •  HYDROcodone-acetaminophen (NORCO)  MG per tablet, 1 tab po q6 hrs from 12/21/15-1/8/16, until seen by Pain Mgmt, Disp: , Rfl:   •  HYDROcodone-acetaminophen (NORCO)  MG per tablet, 1 tab po q6 hrs from 12/21/15-1/8/16, until seen by Pain Mgmt, Disp: , Rfl:   •  isosorbide mononitrate (IMDUR) 30 MG 24 hr tablet, Take 30 mg by mouth daily., Disp: , Rfl:   •  metoprolol tartrate (LOPRESSOR) 25 MG tablet, TAKE 1 TABLET BY MOUTH NIGHTLY., Disp: , Rfl:   •  metoprolol tartrate (LOPRESSOR) 25 MG tablet, TAKE 1 TABLET BY MOUTH NIGHTLY., Disp: , Rfl:   •  raNITIdine (ZANTAC) 300 MG tablet, Take 300 mg by mouth., Disp: , Rfl:   •  raNITIdine (ZANTAC) 300 MG tablet, , Disp: , Rfl:   •  raNITIdine (ZANTAC) 300 MG tablet, Take 300 mg by mouth., Disp: , Rfl:   •  HYDROcodone-acetaminophen (NORCO)  MG per tablet, Take 1 tablet by mouth 4 (Four) Times a Day for 30 days., Disp: 120 tablet, Rfl: 0  •  HYDROcodone-acetaminophen (NORCO)  MG per tablet, Take 1 tablet by mouth 4 (Four) Times a Day for 30 days., Disp: 120 tablet, Rfl: 0    Allergies   Allergen Reactions   • Cymbalta [Duloxetine Hcl]      Made heart speed up- stomach cramps- couldn't sleep- flush face   • Isosorbide Palpitations     Heart palpitations, headache, dizzy       Review of Systems   Musculoskeletal: Positive for back pain (lower).   All other systems reviewed and are negative.    All systems reviewed and negative except for above.    Physical Exam   Constitutional: He appears well-developed and  well-nourished. No distress.   Musculoskeletal:        Lumbar back: He exhibits decreased range of motion (45deg flexion with approx 10-15 deg ext with pain in both directions. ).   Neurological: He is alert. Gait (cane) abnormal.   Psychiatric: He has a normal mood and affect. His behavior is normal. Judgment normal.       Jason was seen today for back pain.    Diagnoses and all orders for this visit:    DDD (degenerative disc disease), lumbar    Chronic bilateral low back pain without sciatica    Hx of lumbosacral spine surgery    Other orders  -     HYDROcodone-acetaminophen (NORCO)  MG per tablet; Take 1 tablet by mouth 4 (Four) Times a Day for 30 days.  -     HYDROcodone-acetaminophen (NORCO)  MG per tablet; Take 1 tablet by mouth 4 (Four) Times a Day for 30 days.  -     gabapentin (NEURONTIN) 400 MG capsule; Take 1 capsule by mouth 3 (Three) Times a Day.        Medication: Patient reports no negative side effects, Patient reports appropriate usage and storage habits, Patient's opioid provides enough relief to be more active and perform activities of daily living with less discomfort. and Refill opioid medication as above.   I explained that the pain clinic will be closing after the first of the year.  I let the pt know where I will be located.     Interventional: none at this time    Rehab: none at this time    Behavioral: No aberrant behavior noted. YANI Report #[id number]  was reviewed and is consistent with stated history    Urine drug screen None at this time          This document has been electronically signed by Speedy Waston MD on December 13, 2017 2:16 PM

## 2017-12-21 ENCOUNTER — OFFICE VISIT (OUTPATIENT)
Dept: SLEEP MEDICINE | Facility: HOSPITAL | Age: 52
End: 2017-12-21

## 2017-12-21 VITALS
OXYGEN SATURATION: 99 % | WEIGHT: 193 LBS | BODY MASS INDEX: 27.63 KG/M2 | HEART RATE: 128 BPM | DIASTOLIC BLOOD PRESSURE: 74 MMHG | HEIGHT: 70 IN | SYSTOLIC BLOOD PRESSURE: 136 MMHG

## 2017-12-21 DIAGNOSIS — G47.33 OBSTRUCTIVE SLEEP APNEA, ADULT: Primary | ICD-10-CM

## 2017-12-21 PROCEDURE — 99203 OFFICE O/P NEW LOW 30 MIN: CPT | Performed by: INTERNAL MEDICINE

## 2017-12-21 NOTE — PROGRESS NOTES
New Patient Sleep Medicine Consultation    Encounter Date: 12/21/2017         Patient's PCP: Obi Redd MD  Referring provider: No ref. provider found  Reason for consultation: snoring, witnessed apneas, excessive daytime sleepiness and unrefreshing sleep    Jason Garcia is a 52 y.o. male who presents with above complaints for many years. He  admits to daytime fatigue, teeth grinding, night sweats, morning headaches, HTN, RLS, vivid dreams, and non-restorative sleep. His bedtime is ~ 4632-2382. He  falls asleep after  minutes, and is up infrequently, but tosses all night. He wakes up ~ 2493-3751. He drinks 0 cups of coffee, 1 teas, and 3-5 sodas per day. He drinks no alcoholic beverages per week. He does not smoke (quit after 6 years, [id number]). He denies cataplexy, sleep paralysis, or hypnagogic hallucinations. He takes elavil to help with sleep onset. He has rare sleepiness with driving. He occasionally naps.     [place] - 17    Past Medical History:   Diagnosis Date   • Anxiety     Anxiety state      • Arthropathy of lumbar facet joint    • Degeneration of lumbar intervertebral disc    • Depression     Depressive disorder      • Drug therapy     Other long term (current) drug therapy      • Hypertensive disorder    • Osteoporosis      Social History     Social History   • Marital status:      Spouse name: N/A   • Number of children: N/A   • Years of education: N/A     Occupational History   • Not on file.     Social History Main Topics   • Smoking status: Former Smoker   • Smokeless tobacco: Never Used   • Alcohol use No   • Drug use: No   • Sexual activity: Defer     Other Topics Concern   • Not on file     Social History Narrative     Family History   Problem Relation Age of Onset   • Alcohol abuse Other    • Hyperlipidemia Other    • Hypertension Other    • Migraines Other    • Mental illness Other      Mental Disorder     , no children, on disability, 1 dog and 1 cat  FH of  "sleep disorders: none known    Review of Systems:  Pertinent items are noted in HPI. Patient advised to discuss any positive ROS with PCP.      Vitals:    12/21/17 1505   BP: 136/74   Pulse: (!) 128   SpO2: 99%     Body mass index is 27.69 kg/(m^2).  Neck circumference: 16.5\"          General: Alert. Cooperative. Well developed. No acute distress.             Head:  Normocephalic. Symmetrical. Atraumatic.              Eyes: Sclera clear. No icterus. PERRLA. Normal EOM.             Ears: No deformities. Normal hearing.             Nose: No septal deviation. No drainage.          Throat: No oral lesions. No thrush. Moist mucous membranes.    Tongue is normal    Dentition is fair       Pharynx: Posterior pharyngeal pillars are narrow    Mallampati score of IV (only hard palate visible)    Pharynx is normal with unrermarkable tonsils   Chest Wall:  Normal shape. Symmetric expansion with respiration. No tenderness.             Neck:  Trachea midline.           Lungs:  Clear to auscultation bilaterally. No wheezes. No rhonchi. No rales. Respirations regular, even and unlabored.            Heart:  Regular rhythm and normal rate. Normal S1 and S2. No murmur.     Abdomen:  Soft, non-tender and non-distended. Normal bowel sounds. No masses.  Extremities:  Moves all extremities well. No edema.           Pulses: Pulses palpable and equal bilaterally.               Skin: Dry. Intact. No bleeding. No rash.           Neuro: Moves all 4 extremities and cranial nerves grossly intact.  Psychiatric: Normal mood and affect.      Current Outpatient Prescriptions:   •  amitriptyline (ELAVIL) 50 MG tablet, Take 50 mg by mouth every night., Disp: , Rfl:   •  amitriptyline (ELAVIL) 50 MG tablet, TAKE 1 TABLET BY MOUTH NIGHTLY., Disp: , Rfl:   •  diclofenac sodium (VOTAREN XR) 100 MG 24 hr tablet, Take 50 mg by mouth Daily., Disp: , Rfl:   •  gabapentin (NEURONTIN) 400 MG capsule, Take 400 mg by mouth., Disp: , Rfl:   •  gabapentin " (NEURONTIN) 400 MG capsule, Take 400 mg by mouth., Disp: , Rfl:   •  gabapentin (NEURONTIN) 400 MG capsule, Take 1 capsule by mouth 3 (Three) Times a Day., Disp: 90 capsule, Rfl: 3  •  gabapentin (NEURONTIN) 400 MG capsule, Take 1 capsule by mouth 3 (Three) Times a Day., Disp: 90 capsule, Rfl: 3  •  HYDROcodone-acetaminophen (NORCO)  MG per tablet, Take 1 tablet by mouth every 6 (six) hours as needed., Disp: , Rfl:   •  HYDROcodone-acetaminophen (NORCO)  MG per tablet, 1 tab po q6 hrs from 12/21/15-1/8/16, until seen by Pain Mgmt, Disp: , Rfl:   •  HYDROcodone-acetaminophen (NORCO)  MG per tablet, 1 tab po q6 hrs from 12/21/15-1/8/16, until seen by Pain Mgmt, Disp: , Rfl:   •  HYDROcodone-acetaminophen (NORCO)  MG per tablet, Take 1 tablet by mouth 4 (Four) Times a Day for 30 days., Disp: 120 tablet, Rfl: 0  •  HYDROcodone-acetaminophen (NORCO)  MG per tablet, Take 1 tablet by mouth 4 (Four) Times a Day for 30 days., Disp: 120 tablet, Rfl: 0  •  isosorbide mononitrate (IMDUR) 30 MG 24 hr tablet, Take 30 mg by mouth daily., Disp: , Rfl:   •  metoprolol tartrate (LOPRESSOR) 25 MG tablet, TAKE 1 TABLET BY MOUTH NIGHTLY., Disp: , Rfl:   •  metoprolol tartrate (LOPRESSOR) 25 MG tablet, TAKE 1 TABLET BY MOUTH NIGHTLY., Disp: , Rfl:   •  raNITIdine (ZANTAC) 300 MG tablet, Take 300 mg by mouth., Disp: , Rfl:   •  raNITIdine (ZANTAC) 300 MG tablet, , Disp: , Rfl:   •  raNITIdine (ZANTAC) 300 MG tablet, Take 300 mg by mouth., Disp: , Rfl:     ASSESSMENT:  1. Obstructive sleep apnea and possible CSA  1. Check in lab PSG  2. Chronic pain       This document has been electronically signed by Cameron Orourke MD on December 21, 2017         CC: Obi Redd MD          No ref. provider found

## 2018-01-17 ENCOUNTER — HOSPITAL ENCOUNTER (OUTPATIENT)
Dept: SLEEP MEDICINE | Facility: HOSPITAL | Age: 53
Discharge: HOME | End: 2018-01-17
Attending: INTERNAL MEDICINE

## 2018-02-20 ENCOUNTER — HOSPITAL ENCOUNTER (OUTPATIENT)
Dept: SLEEP MEDICINE | Facility: HOSPITAL | Age: 53
Discharge: HOME | End: 2018-02-20
Attending: INTERNAL MEDICINE | Admitting: INTERNAL MEDICINE

## 2018-02-20 VITALS — HEIGHT: 70 IN | BODY MASS INDEX: 27.63 KG/M2 | WEIGHT: 193 LBS

## 2018-02-20 DIAGNOSIS — G47.33 OBSTRUCTIVE SLEEP APNEA, ADULT: ICD-10-CM

## 2018-02-20 PROCEDURE — 95810 POLYSOM 6/> YRS 4/> PARAM: CPT | Performed by: INTERNAL MEDICINE

## 2018-02-20 PROCEDURE — 95810 POLYSOM 6/> YRS 4/> PARAM: CPT

## 2018-02-28 DIAGNOSIS — G47.33 OSA (OBSTRUCTIVE SLEEP APNEA): Primary | ICD-10-CM

## 2018-03-12 ENCOUNTER — OFFICE VISIT (OUTPATIENT)
Dept: SLEEP MEDICINE | Facility: HOSPITAL | Age: 53
End: 2018-03-12

## 2018-03-12 VITALS
SYSTOLIC BLOOD PRESSURE: 154 MMHG | HEIGHT: 70 IN | DIASTOLIC BLOOD PRESSURE: 88 MMHG | BODY MASS INDEX: 27.49 KG/M2 | WEIGHT: 192 LBS

## 2018-03-12 DIAGNOSIS — F51.04 PSYCHOPHYSIOLOGICAL INSOMNIA: ICD-10-CM

## 2018-03-12 DIAGNOSIS — G47.33 OBSTRUCTIVE SLEEP APNEA, ADULT: Primary | ICD-10-CM

## 2018-03-12 PROCEDURE — 99212 OFFICE O/P EST SF 10 MIN: CPT | Performed by: INTERNAL MEDICINE

## 2018-03-12 NOTE — PROGRESS NOTES
CHIEF COMPLAINT: follow up sleep study results    HPI:The patient is a 53 y.o. male.  He returns to sleep clinic to discuss the results of the recent sleep study.  He had a diagnostic polysomnogram on 02/20/2018.  The AHI was 30.3, but the onset to sleep was ~ 300 minutes. Barely 2 hours of sleep was seen.     INTERVAL MEDICAL HISTORY: no change in medicine or admissions    MEDICATIONS:   Current Outpatient Prescriptions:   •  amitriptyline (ELAVIL) 50 MG tablet, Take 50 mg by mouth every night., Disp: , Rfl:   •  amitriptyline (ELAVIL) 50 MG tablet, TAKE 1 TABLET BY MOUTH NIGHTLY., Disp: , Rfl:   •  diclofenac sodium (VOTAREN XR) 100 MG 24 hr tablet, Take 50 mg by mouth Daily., Disp: , Rfl:   •  gabapentin (NEURONTIN) 400 MG capsule, Take 400 mg by mouth., Disp: , Rfl:   •  gabapentin (NEURONTIN) 400 MG capsule, Take 400 mg by mouth., Disp: , Rfl:   •  gabapentin (NEURONTIN) 400 MG capsule, Take 1 capsule by mouth 3 (Three) Times a Day., Disp: 90 capsule, Rfl: 3  •  gabapentin (NEURONTIN) 400 MG capsule, Take 1 capsule by mouth 3 (Three) Times a Day., Disp: 90 capsule, Rfl: 3  •  HYDROcodone-acetaminophen (NORCO)  MG per tablet, Take 1 tablet by mouth every 6 (six) hours as needed., Disp: , Rfl:   •  HYDROcodone-acetaminophen (NORCO)  MG per tablet, 1 tab po q6 hrs from 12/21/15-1/8/16, until seen by Pain Mgmt, Disp: , Rfl:   •  HYDROcodone-acetaminophen (NORCO)  MG per tablet, 1 tab po q6 hrs from 12/21/15-1/8/16, until seen by Pain Mgmt, Disp: , Rfl:   •  isosorbide mononitrate (IMDUR) 30 MG 24 hr tablet, Take 30 mg by mouth daily., Disp: , Rfl:   •  metoprolol tartrate (LOPRESSOR) 25 MG tablet, TAKE 1 TABLET BY MOUTH NIGHTLY., Disp: , Rfl:   •  metoprolol tartrate (LOPRESSOR) 25 MG tablet, TAKE 1 TABLET BY MOUTH NIGHTLY., Disp: , Rfl:   •  raNITIdine (ZANTAC) 300 MG tablet, , Disp: , Rfl:     PHYSICAL EXAM        ICU Vital Signs    No documentation.       Gen:  No distress, appears stated age,  alert, oriented to person, place, and time  Heent:   NC/AT, PERRLA, EOMI, anicteric sclera    External ears/nose normal, OP clear, Mallamati 4  LUNGS: Clear breath sounds bilaterally, nonlabored breathing  CV:  Normal S1/S2, without murmur, no peripheral edema  ABD:  Non tender, non distended, Bowel sounds not appreciated  EXT:  No cyanosis or clubbing      IMPRESSION AND PLAN:  1. Severe Obstructive Sleep Apnea Hypopnea Syndrome - AHI of 30.3    The sleep results were discussed in detail with the patient.  The risks of untreated sleep apnea were reviewed.  Treatment options for sleep apnea were discussed. I counseled patient on sleep hygiene, including regular sleep wake schedule and stimulus control therapy, the importance of weight reduction, and abstaining from smoking and alcohol consumption.  He will follow-up in 31-90 days with a compliance download.  He agrees to return sooner on a prn basis.    2. Sleep onset insomnia -    Will address once on CPAP         All of the patient's questions were answered. He states understanding and agreement with my assessment and plan as above.  Total time 15 min, more than half spent in face to face counseling and coordination of care.           This document has been electronically signed by Cameron Orourke MD on March 12, 2018           CC: Obi Redd MD          No ref. provider found

## 2018-06-12 ENCOUNTER — OFFICE VISIT (OUTPATIENT)
Dept: SLEEP MEDICINE | Facility: HOSPITAL | Age: 53
End: 2018-06-12

## 2018-06-12 VITALS
BODY MASS INDEX: 27.06 KG/M2 | SYSTOLIC BLOOD PRESSURE: 173 MMHG | WEIGHT: 189 LBS | DIASTOLIC BLOOD PRESSURE: 103 MMHG | HEIGHT: 70 IN

## 2018-06-12 DIAGNOSIS — G47.33 OBSTRUCTIVE SLEEP APNEA, ADULT: Primary | ICD-10-CM

## 2018-06-12 PROCEDURE — 99213 OFFICE O/P EST LOW 20 MIN: CPT | Performed by: INTERNAL MEDICINE

## 2018-06-12 NOTE — PROGRESS NOTES
Sleep Clinic Follow Up    Date: 6/12/2018  Primary Care Physician: Obi Redd MD      Interim History (1/3):  Since the last visit on 03/12/2018, patient has:      1)  ЮЛИЯ - Has remained compliant with CPAP. Snoring is gone, sleeping deeper. He still tosses from pain. Has more energy and improvement all around. He denies mask and machine issues, dry mouth, headaches, pressures intolerance, or non-compliance. He denies abnormal dreams, sleep paralysis, nasal congestion, URI sx.    PAP Data:  Time frame: 03/20/2018 - 06/11/2018   Compliance 97.6 %  Average use on days used: 6 hrs 54 min  PAP range : 5-20 cm H2O  Average 90% pressure: 8.6 cmH2O  Leak: 28 minutes  Average AHI 4.4 events/hr  Mask type: [facility] - [place]  DME: [facility]    Bed time: 0000  Sleep latency: 60 minutes  Number of times awakens during the night: 1-3  Wake time: 1565-0902  Estimated total sleep time at night: 4-6 hours  Caffeine intake: 1-2 glasses  Alcohol intake: none  Nap time: none  Sleepiness with Driving: none    [place] - 5    2) Patient denies RLS symptoms.     PMHx, FH, SH reviewed and pertinent changes are: unchanged from last office visit on 03/12/2018      REVIEW OF SYSTEMS:   Negative for chest pain, fever, chills, SOA, abdominal pain. Smoking: none      Exam (6-11/12):    Vitals:    06/12/18 1440   BP: (!) 173/103   Did not take BP meds last night, will when he gets home.    Body mass index is 27.12 kg/m². Patient's Body mass index is 27.12 kg/m². BMI is above normal parameters. Recommendations include: referral to primary care.      Gen:  No distress, conversant, pleasant, appears stated age, alert, oriented  Eyes:   Anicteric sclera, moist conjunctiva, no lid lag     PERRLA, EOMI   Heent:   NC/AT    Oropharynx clear, Mallampati 4    normal hearing  Lungs:  Normal effort, non-labored breathing    Clear to auscultation    CV:  Normal S1/S2, no murmur    no lower extremity edema  ABD:  Soft, normal bowel  sounds    Psych:  Appropriate affect  Neuro:  CN 2-12 intact    Past Medical History:   Diagnosis Date   • Anxiety     Anxiety state      • Arthropathy of lumbar facet joint    • Degeneration of lumbar intervertebral disc    • Depression     Depressive disorder      • Drug therapy     Other long term (current) drug therapy      • Hypertensive disorder    • Osteoporosis        Current Outpatient Prescriptions:   •  amitriptyline (ELAVIL) 50 MG tablet, Take 50 mg by mouth every night., Disp: , Rfl:   •  amitriptyline (ELAVIL) 50 MG tablet, TAKE 1 TABLET BY MOUTH NIGHTLY., Disp: , Rfl:   •  diclofenac sodium (VOTAREN XR) 100 MG 24 hr tablet, Take 50 mg by mouth Daily., Disp: , Rfl:   •  gabapentin (NEURONTIN) 400 MG capsule, Take 400 mg by mouth., Disp: , Rfl:   •  gabapentin (NEURONTIN) 400 MG capsule, Take 400 mg by mouth., Disp: , Rfl:   •  gabapentin (NEURONTIN) 400 MG capsule, Take 1 capsule by mouth 3 (Three) Times a Day., Disp: 90 capsule, Rfl: 3  •  gabapentin (NEURONTIN) 400 MG capsule, Take 1 capsule by mouth 3 (Three) Times a Day., Disp: 90 capsule, Rfl: 3  •  HYDROcodone-acetaminophen (NORCO)  MG per tablet, Take 1 tablet by mouth every 6 (six) hours as needed., Disp: , Rfl:   •  HYDROcodone-acetaminophen (NORCO)  MG per tablet, 1 tab po q6 hrs from 12/21/15-1/8/16, until seen by Pain Mgmt, Disp: , Rfl:   •  HYDROcodone-acetaminophen (NORCO)  MG per tablet, 1 tab po q6 hrs from 12/21/15-1/8/16, until seen by Pain Mgmt, Disp: , Rfl:   •  isosorbide mononitrate (IMDUR) 30 MG 24 hr tablet, Take 30 mg by mouth daily., Disp: , Rfl:   •  metoprolol tartrate (LOPRESSOR) 25 MG tablet, TAKE 1 TABLET BY MOUTH NIGHTLY., Disp: , Rfl:   •  metoprolol tartrate (LOPRESSOR) 25 MG tablet, TAKE 1 TABLET BY MOUTH NIGHTLY., Disp: , Rfl:   •  raNITIdine (ZANTAC) 300 MG tablet, , Disp: , Rfl:       ASSESSMENT / PLAN:     1. Obstructive sleep apnea  1. PSG on 02/20/2018, AHI of 30.3  2. Currently on 5-20 cm  H2O  3. Continue PAP as prescribed.   4. Script for PAP supplies  5. Return to clinic in 1 year with compliance check unless sx change in the interim period.  2. Chronic pain  1. Stable on elavil, neurontin, norco  3. HTN  1. Resume lopressor  2. Follow up with pcp      Total time 15 min, more than half spent in face to face counseling and coordination of care.    RTC in 12 months     This document has been electronically signed by Cameron Orourke MD on June 12, 2018         CC: Obi Redd MD          No ref. provider found